# Patient Record
Sex: MALE | Race: WHITE | HISPANIC OR LATINO | Employment: OTHER | URBAN - METROPOLITAN AREA
[De-identification: names, ages, dates, MRNs, and addresses within clinical notes are randomized per-mention and may not be internally consistent; named-entity substitution may affect disease eponyms.]

---

## 2017-11-27 ENCOUNTER — OFFICE VISIT (OUTPATIENT)
Dept: URGENT CARE | Age: 22
End: 2017-11-27
Payer: MEDICARE

## 2017-11-27 PROCEDURE — G0382 LEV 3 HOSP TYPE B ED VISIT: HCPCS | Performed by: FAMILY MEDICINE

## 2017-11-27 PROCEDURE — 99213 OFFICE O/P EST LOW 20 MIN: CPT | Performed by: FAMILY MEDICINE

## 2017-11-28 NOTE — PROGRESS NOTES
Assessment    1  Acute upper respiratory infection (465 9) (J06 9)    Plan  Acute upper respiratory infection    · Amoxicillin 500 MG Oral Capsule; TAKE 1 CAPSULE 3 TIMES DAILY UNTIL GONE    Discussion/Summary  Discussion Summary:   Amoxicillin 3 times a day until finished ( please take probiotics)  medication as needed  as needed  drinks, light/BRAT diet  Gargle and swish mouth with warm salt water or mouthwash  recheck/ follow-up with family physician  go to the hospital emergency department if worse  Chief Complaint    1  Cold Symptoms  Chief Complaint Free Text Note Form: Started with cold symptoms 1 week ago with congestion, runny nose , sore throat, neck pain and a cough  Today started with diarrhea  History of Present Illness  HPI: Congestion, sore throat, cough, neck pain, diarrhea, vomiting   Hospital Based Practices Required Assessment:  Pain Assessment  the patient states they have pain  The pain is located in the throat and sides of neck, body  The patient describes the pain as aching  (on a scale of 0 to 10, the patient rates the pain at 4 )  Abuse And Domestic Violence Screen   Yes, the patient is safe at home  -- The patient states no one is hurting them  Depression And Suicide Screen  No, the patient has not had thoughts of hurting themself  No, the patient has not felt depressed in the past 7 days  Review of Systems  Focused-Male:  Constitutional: as noted in HPI   ENT: sore throat-- and-- nasal discharge, but-- as noted in HPI  Cardiovascular: no complaints of slow or fast heart rate, no chest pain, no palpitations, no leg claudication or lower extremity edema  Respiratory: cough, but-- as noted in HPI  Gastrointestinal: vomiting-- and-- diarrhea, but-- as noted in HPI-- and-- no blood in stools  Genitourinary: no complaints of dysuria or incontinence, no hesitancy, no nocturia, no genital lesion, no inadequacy of penile erection  Musculoskeletal: as noted in HPI  Integumentary: no complaints of skin rash or lesion, no itching or dry skin, no skin wounds  Neurological: no complaints of headache, no confusion, no numbness or tingling, no dizziness or fainting  ROS Reviewed:   ROS reviewed  Active Problems  1  Contusion of right hand, initial encounter (923 20) (L82 765M)   2  Injury of right hand, initial encounter (959 4) (S69 91XA)   3  Viral URI with cough (465 9) (J06 9,B97 89)    Past Medical History  Active Problems And Past Medical History Reviewed: The active problems and past medical history were reviewed and updated today  Family History  Family History Reviewed: The family history was reviewed and updated today  Social History   · No alcohol use   · No drug use   · Occasional alcohol use   · Tobacco use (305 1) (Z72 0)  Social History Reviewed: The social history was reviewed and updated today  The social history was reviewed and is unchanged  Surgical History  Surgical History Reviewed: The surgical history was reviewed and updated today  Current Meds   1  No Reported Medications Recorded  Medication List Reviewed: The medication list was reviewed and updated today  Allergies    1  No Known Drug Allergies    Vitals  Signs   Recorded: 27Nov2017 12:49PM   Temperature: 98 1 F, Temporal  Heart Rate: 71  Respiration: 18  Systolic: 793, LUE, Sitting  Diastolic: 78, LUE, Sitting  Height: 5 ft 8 in  Weight: 240 lb   BMI Calculated: 36 49  BSA Calculated: 2 21  O2 Saturation: 97    Physical Exam   Constitutional  General appearance: No acute distress, well appearing and well nourished  Ears, Nose, Mouth, and Throat  External inspection of ears and nose: Normal    Otoscopic examination: Tympanic membrance translucent with normal light reflex  Canals patent without erythema  -- nasal congestion  -- injection of the oropharynx  Pulmonary  Respiratory effort: No increased work of breathing or signs of respiratory distress  Auscultation of lungs: Clear to auscultation  Cardiovascular  Auscultation of heart: Normal rate and rhythm, normal S1 and S2, without murmurs  Abdomen soft, nontender, no guarding  Lymphatic  Palpation of lymph nodes in neck: No lymphadenopathy  Skin good color and turgor  Neurologic grossly intact, no nuchal rigidity  Psychiatric  Orientation to person, place and time: Normal    Mood and affect: Normal        Message  Return to work or school:    He is able to return to work on  11/29/2017      No work 11/27/2017 and 11/28/2017          Signatures   Electronically signed by : Alejandra Watlers DO; Nov 27 2017  1:10PM EST                       (Author)

## 2017-12-22 ENCOUNTER — HOSPITAL ENCOUNTER (OUTPATIENT)
Dept: RADIOLOGY | Facility: HOSPITAL | Age: 22
Discharge: HOME | End: 2017-12-22

## 2017-12-22 ENCOUNTER — TRANSCRIBE ORDERS (OUTPATIENT)
Dept: URGENT CARE | Age: 22
End: 2017-12-22

## 2017-12-22 ENCOUNTER — APPOINTMENT (OUTPATIENT)
Dept: RADIOLOGY | Age: 22
End: 2017-12-22

## 2017-12-22 ENCOUNTER — OFFICE VISIT (OUTPATIENT)
Dept: URGENT CARE | Age: 22
End: 2017-12-22

## 2017-12-22 DIAGNOSIS — R07.81 PLEURODYNIA: ICD-10-CM

## 2017-12-22 DIAGNOSIS — R05.9 COUGH: ICD-10-CM

## 2017-12-22 PROCEDURE — 99213 OFFICE O/P EST LOW 20 MIN: CPT | Performed by: FAMILY MEDICINE

## 2017-12-22 PROCEDURE — G0382 LEV 3 HOSP TYPE B ED VISIT: HCPCS | Performed by: FAMILY MEDICINE

## 2017-12-22 PROCEDURE — 71101 X-RAY EXAM UNILAT RIBS/CHEST: CPT

## 2018-01-15 NOTE — MISCELLANEOUS
Message  Call to one of patient's listed contacts  Current phone number in patient's chart is incorrect  Patient was finally able to be gotten hold of through contact  Patient told of possible nondisplaced fracture base fifth metacarpal  He will return for splint later today  Signatures   Electronically signed by :  Raheem Morgan, [facility]; Oct  7 2016  3:26PM EST                       (Author)

## 2018-01-18 NOTE — RESULT NOTES
Message  pATIENT RETURNED TO CLINIC THIS EVENING FOR BOXER SPLINT APPLIED TO RIGHT HAND  oR SO REFERRAL GIVEN FOR FURTHER EVALUATION  rADIOLOGIST SUSPECTS SMALL NONDISPLACED FRACTURE AT BASE OF FIFTH METACARPAL  Verified Results  * XR HAND 3+ VIEW RIGHT 06Oct2016 08:03PM Shira Conley Order Number: [id number]     Test Name Result Flag Reference   XR HAND 3+ VW RIGHT (Report)     RIGHT HAND     INDICATION: Fifth digit pain  Prior fracture 8 months ago  COMPARISON: 8/22/2013  VIEWS: 3; 3 images     For the purposes of institution wide universal language the following terms will apply: (thumb=1st digit/finger, index finger=2nd digit/finger, long finger=3rd digit/finger, ring=4th digit/finger and small finger=5th digit/finger)     FINDINGS:     Possible nondisplaced fracture involving the base of the fifth metacarpal seen on the oblique view only  Remodeling of the distal metacarpal related to old fracture  No degenerative changes  No lytic or blastic lesions are seen  Moderate soft tissue swelling adjacent to the fifth metacarpal posteriorly  IMPRESSION:     Possible nondisplaced fracture involving the base of the fifth metacarpal  There is remodeling of the distal shaft of the fifth metacarpal related to an old fracture  Moderate dorsal soft tissue swelling overlying the fifth metacarpal        Workstation performed: [id number]     Signed by:   Victor Manuel Nelson DO   10/6/16       Plan  Contusion of right hand, initial encounter    · Ace Bandage; Status:Complete;   Done: [id number]  Injury of right hand, initial encounter    · *1 -  [facility] (ORTHOPEDIC SURGERY ) Physician  Referral  Consult  Status: Active  Requested for: 89OHI0233  Care Summary provided  : Yes   · * XR HAND 3+ VIEW RIGHT; Status:Complete;   Done: 71LYS6888 08:03PM    Signatures   Electronically signed by :  Panda Rivera [facility]; Oct  7 2016  8:33PM EST                       (Author)

## 2018-01-18 NOTE — MISCELLANEOUS
Message  Return to work or school:    He is able to return to work on  11/29/2017      No work 11/27/2017 and 11/28/2017          Signatures   Electronically signed by : Torrie Boyle DO; Nov 27 2017  1:10PM EST                       (Author)

## 2018-01-18 NOTE — MISCELLANEOUS
Message  Return to work or school:   Scar Copeland is under my professional care  He was seen in my office on 10/6/2016             Signatures  Electronically signed by :  Thao Oseguera, [facility]; Oct  6 2016  8:24PM EST                       (Author)

## 2018-01-18 NOTE — MISCELLANEOUS
Message  10-7-16: 1  Telephone call to phone number on [facility] is chart requesting callback regarding Radiology results of x-ray taken last evening urgent care  11:48 AM        1 Amended By: Erich Do; Oct 07 2016 2:47 PM EST    Plan  Contusion of right hand, initial encounter    · Ace Bandage; Status:Complete;   Done: [id number]  Injury of right hand, initial encounter    · * XR HAND 3+ VIEW RIGHT; Status:Resulted - Requires Verification;   Done: 53FSI5013  08:03PM    Signatures   Electronically signed by :  Leandro Meyer, [facility]; Oct  7 2016  3:48PM EST                       (Author)

## 2018-01-24 VITALS
SYSTOLIC BLOOD PRESSURE: 134 MMHG | HEIGHT: 69 IN | DIASTOLIC BLOOD PRESSURE: 80 MMHG | WEIGHT: 240 LBS | OXYGEN SATURATION: 96 % | HEART RATE: 81 BPM | BODY MASS INDEX: 35.55 KG/M2 | RESPIRATION RATE: 22 BRPM | TEMPERATURE: 98.7 F

## 2018-01-24 NOTE — MISCELLANEOUS
Message  Spoke to patient regarding his xray results  He has not been able to fill his prescriptions yet  He reports he continues to have symptoms  Discussed that he does not have a pneumonia at this time, patient understands        Plan  Acute upper respiratory infection    · Clarithromycin 250 MG Oral Tablet; TAKE 1 TABLET EVERY 12 HOURS DAILY  Costochondritis    · Methocarbamol 500 MG Oral Tablet (Robaxin); TAKE 2 TABLETS 4 TIMES DAILY  AS NEEDED   · Naproxen 500 MG Oral Tablet; TAKE 1 TABLET EVERY 12 HOURS DAILY    Signatures   Electronically signed by : WINIFRED Rosales; Dec 26 2017  8:18AM EST                       (Author)

## 2018-01-24 NOTE — PROGRESS NOTES
Assessment    1  Cough (786 2) (R05)   2  Rib pain on left side (786 50) (R07 81)   3  Acute upper respiratory infection (465 9) (J06 9)   4  Costochondritis (733 6) (M94 0)    Plan  Acute upper respiratory infection    · Clarithromycin 250 MG Oral Tablet; TAKE 1 TABLET EVERY 12 HOURS DAILY  Costochondritis    · Methocarbamol 500 MG Oral Tablet (Robaxin); TAKE 2 TABLETS 4 TIMES DAILY AS  NEEDED   · Naproxen 500 MG Oral Tablet; TAKE 1 TABLET EVERY 12 HOURS DAILY  Cough, Rib pain on left side    · * XR RIBS LEFT W PA CHEST MIN 3 VIEWS; Status:Active; Requested for:[id number];     Discussion/Summary  Discussion Summary:   Upper respiratory infection: Begin Biaxin twice daily for 10 days  Eat yogurt to avoid stomach upset  Continue with Tylenol Cod and Cough for your symptoms  Costochondritis: Use Naproxen twice daily for inflammation and pain  Do not take anything OTC containing ibuprofen while using Naproxen  Use Robaxin for muscle relaxation, may cause drowsiness, do not operate motor vehicles while using this medication  Stay hydrated  Return to your PCP for continued symptoms  Go to the ER for any respiratory distress  Understands and agrees with treatment plan: The treatment plan was reviewed with the patient/guardian  The patient/guardian understands and agrees with the treatment plan      Chief Complaint  Chief Complaint Free Text Note Form: was here a few weeks ago,tx for uri  now he continues to cough, has chills at night  has yellow green mucous production  taking cold and flu liquid  now pain in left rib cage with any deep breath   petra      History of Present Illness  HPI: This is a 18-year-old male presenting URI symptoms with cough x 4-5 weeks  He was seen 3 weeks ago and treated with amoxicillin, he states his symptoms improved slightly but did not go away  He is a smoker  He is complaining productive cough, nasal congestion, sore throat  He admits to posttussive emesis   He is also complaining of fevers and states that he has woken up multiple times drenched in sweat  He denies any abdominal pain, diarrhea, nausea  He woke up this morning with a sharp, constant pain in his lower left rib area  Pain is worse with movement such as coughing, and got a little bit better when he took Advil  At home he has been using Tylenol Cough and cold which helped his symptoms a little  Hospital Based Practices Required Assessment:   Abuse And Domestic Violence Screen    Yes, the patient is safe at home  The patient states no one is hurting them  Depression And Suicide Screen  No, the patient has not had thoughts of hurting themself  No, the patient has not felt depressed in the past 7 days  Review of Systems  Focused-Male:   Constitutional: fever, feeling poorly, chills and feeling tired  ENT: sore throat and nasal discharge, but no earache, no nosebleeds and no hearing loss  Cardiovascular: rib pain, but as noted in HPI, the heart rate was not slow, no chest pain, the heart rate was not fast and no palpitations  Respiratory: cough, but no shortness of breath, no orthopnea, no wheezing and no shortness of breath during exertion  Gastrointestinal: no complaints of abdominal pain, no constipation, no nausea or vomiting, no diarrhea or bloody stools  Musculoskeletal: myalgias, but as noted in HPI, no joint swelling and no joint stiffness  Integumentary: no rashes  Neurological: no confusion  Active Problems    1  Acute upper respiratory infection (465 9) (J06 9)   2  Contusion of right hand, initial encounter (923 20) (S60 221A)   3  Injury of right hand, initial encounter (959 4) (S69 91XA)   4  Rib pain on left side (786 50) (R07 81)   5  Viral URI with cough (465 9) (J06 9,B97 89)    Social History    · No alcohol use   · No drug use   · Occasional alcohol use   · Tobacco use (305 1) (Z72 0)    Allergies    1   No Known Drug Allergies    Vitals  Signs   Recorded: 66Ytf5516 08:17PM Temperature: 98 7 F, Oral  Heart Rate: 81  Respiration: 22  Systolic: 702  Diastolic: 80  Height: 5 ft 9 in  Weight: 240 lb   BMI Calculated: 35 44  BSA Calculated: 2 23  O2 Saturation: 96    Physical Exam    Constitutional   General appearance: No acute distress, well appearing and well nourished  Eyes   Conjunctiva and lids: No swelling, erythema, or discharge  Pupils and irises: Equal, round and reactive to light  Ears, Nose, Mouth, and Throat   External inspection of ears and nose: Normal     Otoscopic examination: Tympanic membrance translucent with normal light reflex  Canals patent without erythema  Nasal mucosa, septum, and turbinates: Abnormal   erythematous and mildly edematous nasal turbinates bilaterally  Clear nasal discharge  Oropharynx: Abnormal   Mildly erythematous posterior pharynx with slight edema of the tonsils but no exudates  Pulmonary   Respiratory effort: Abnormal   patient is splinting his breathing due to rib pain on deep inspiration  Auscultation of lungs: Clear to auscultation  Not a great effort for deep breathing by the patient  Cardiovascular   Auscultation of heart: Normal rate and rhythm, normal S1 and S2, without murmurs  Abdomen   Abdomen: Non-tender, no masses  Lymphatic   Palpation of lymph nodes in neck: Abnormal   cervical lymphadenopathy felt  Musculoskeletal   Gait and station: Normal     Inspection/palpation of joints, bones, and muscles: Abnormal   Tenderness to palpation on lower left anterior ribs  Psychiatric   Orientation to person, place and time: Normal     Mood and affect: Normal        Results/Data  Diagnostic Studies Reviewed: I personally reviewed the films/images/results in the office today  My interpretation follows  X-ray Review No acute osseus abnormalities noted        Signatures   Electronically signed by : WINIFRED Hollins; Dec 22 2017 11:41PM EST                       (Author)    Electronically signed by : Kailey Edwards BECCA Martinez ; Dec 31 2017  2:42PM EST                       (Co-author)

## 2018-03-01 ENCOUNTER — OFFICE VISIT (OUTPATIENT)
Dept: URGENT CARE | Age: 23
End: 2018-03-01
Payer: COMMERCIAL

## 2018-03-01 ENCOUNTER — APPOINTMENT (OUTPATIENT)
Dept: RADIOLOGY | Age: 23
End: 2018-03-01
Attending: PHYSICIAN ASSISTANT
Payer: COMMERCIAL

## 2018-03-01 VITALS
TEMPERATURE: 98.2 F | OXYGEN SATURATION: 97 % | HEART RATE: 87 BPM | BODY MASS INDEX: 38 KG/M2 | DIASTOLIC BLOOD PRESSURE: 90 MMHG | RESPIRATION RATE: 18 BRPM | SYSTOLIC BLOOD PRESSURE: 130 MMHG | HEIGHT: 69 IN | WEIGHT: 256.6 LBS

## 2018-03-01 DIAGNOSIS — S69.91XA HAND INJURY, RIGHT, INITIAL ENCOUNTER: ICD-10-CM

## 2018-03-01 DIAGNOSIS — S62.652A NONDISPLACED FRACTURE OF MIDDLE PHALANX OF RIGHT MIDDLE FINGER, INITIAL ENCOUNTER FOR CLOSED FRACTURE: Primary | ICD-10-CM

## 2018-03-01 PROCEDURE — S9088 SERVICES PROVIDED IN URGENT: HCPCS | Performed by: FAMILY MEDICINE

## 2018-03-01 PROCEDURE — 73130 X-RAY EXAM OF HAND: CPT

## 2018-03-01 PROCEDURE — 99213 OFFICE O/P EST LOW 20 MIN: CPT | Performed by: FAMILY MEDICINE

## 2018-03-01 NOTE — LETTER
March 1, 2018     Patient: Red Room   YOB: 1995   Date of Visit: 3/1/2018       To Whom It May Concern: It is my medical opinion that Mary Mohan may return to light duty immediately with the following restrictions: no lifting >5 lbs with right hand for 4-6 weeks       If you have any questions or concerns, please don't hesitate to call           Sincerely,        Mary Carreno PA-C    CC: No Recipients

## 2018-03-02 NOTE — PROGRESS NOTES
oJn Now        NAME: Joseline Allen is a 20 y o  male  : 1995    MRN: [id number]  DATE: 2018  TIME: 10:22 PM    Assessment and Plan   Nondisplaced fracture of middle phalanx of right middle finger, initial encounter for closed fracture [B01 189M]  1  Nondisplaced fracture of middle phalanx of right middle finger, initial encounter for closed fracture     2  Hand injury, right, initial encounter  XR hand 3+ vw right         Patient Instructions   Ice, elevate, use splint on finger for comfort  Avoid heavy lifting for 4-6 weeks  Follow up with orthopedics if you have continued pain in 1 week  Tylenol or motrin for discomfort  Chief Complaint     Chief Complaint   Patient presents with    Hand Injury     Jammed R 3rd finger/hand last night while closing a window  Swelling and numbness on top of hand into finger with some bruising noted on finger  History of Present Illness       21 y/o right hand dominant male hit his right middle finger against a window when he was trying to shut it yesterday  He has very minimal pain, but is bruised and swollen  Review of Systems   Review of Systems   Constitutional: Negative  Musculoskeletal:        Right middle finger pain   All other systems reviewed and are negative  Current Medications     No current outpatient prescriptions on file  Current Allergies     Allergies as of 2018    (No Known Allergies)            The following portions of the patient's history were reviewed and updated as appropriate: allergies, current medications, past family history, past medical history, past social history, past surgical history and problem list    History reviewed  No pertinent past medical history  History reviewed  No pertinent surgical history          Objective   /90 (BP Location: Left arm, Patient Position: Sitting, Cuff Size: Large)   Pulse 87   Temp 98 2 °F (36 8 °C) (Oral)   Resp 18   Ht 5' 9" (1 753 m) Wt 116 kg (256 lb 9 6 oz)   SpO2 97%   BMI 37 89 kg/m²          Physical Exam     Physical Exam   Constitutional: He is oriented to person, place, and time  He appears well-developed and well-nourished  Musculoskeletal:        Arms:  Neurological: He is alert and oriented to person, place, and time  Psychiatric: He has a normal mood and affect  His behavior is normal    Nursing note and vitals reviewed  Splint application  Date/Time: 3/1/2018 10:20 PM  Performed by: Mekhi Pereira  Authorized by: Mekhi Pereira     Consent:     Consent obtained:  Verbal    Consent given by:  Patient  Procedure details:     Laterality:  Right    Location:  Finger    Finger:  R long finger    Strapping: no  Cast type:  Finger    Splint type:  Finger splint, dynamic  Post-procedure details:     Pain:  Unchanged    Sensation:  Normal    Patient tolerance of procedure: Tolerated well, no immediate complications          Xray: preliminary report shows fx of the proximal aspect of the middle phalanx of the middle finger

## 2018-03-02 NOTE — PATIENT INSTRUCTIONS
Ice, elevate, use splint on finger for comfort  Avoid heavy lifting for 4-6 weeks  Follow up with orthopedics if you have continued pain in 1 week  Tylenol or motrin for discomfort  Finger Fracture   WHAT YOU NEED TO KNOW:   A finger fracture is a break in 1 or more of the bones in your finger  DISCHARGE INSTRUCTIONS:   Return to the emergency department if:   · Your cast or splint gets wet, damaged, or comes off  · Your splint or cast feels too tight  · You have severe pain  · Your injured finger is numb, cold, or pale  Contact your healthcare provider or hand specialist if:   · Your pain or swelling gets worse, even after treatment  · You have questions or concerns about your condition or care  Medicines:   · NSAIDs , such as ibuprofen, help decrease swelling, pain, and fever  This medicine is available with or without a doctor's order  NSAIDs can cause stomach bleeding or kidney problems in certain people  If you take blood thinner medicine, always ask your healthcare provider if NSAIDs are safe for you  Always read the medicine label and follow directions  Acetaminophen  decreases pain and fever  It is available without a doctor's order  Ask how much to take and how often to take it  Follow directions  Acetaminophen can cause liver damage if not taken correctly  · Take your medicine as directed  Contact your healthcare provider if you think your medicine is not helping or if you have side effects  Tell him or her if you are allergic to any medicine  Keep a list of the medicines, vitamins, and herbs you take  Include the amounts, and when and why you take them  Bring the list or the pill bottles to follow-up visits  Carry your medicine list with you in case of an emergency  Self-care:   · Wear your splint as directed    Do not remove your splint until you follow up with your healthcare provider or hand specialist      · Apply ice  on your finger for 15 to 20 minutes every hour or as directed  Use an ice pack, or put crushed ice in a plastic bag  Cover it with a towel before you apply it to your skin  Ice helps prevent tissue damage and decreases swelling and pain  · Elevate  your finger above the level of your heart as often as you can  This will help decrease swelling and pain  Prop your hand on pillows or blankets to keep it elevated comfortably  · Go to physical therapy as directed  A physical therapist teaches you exercises to help improve movement and strength, and to decrease pain  Follow up with your healthcare provider or hand specialist within 2 days:  Write down your questions so you remember to ask them during your visits  © 2017 [facility] Information is for End User's use only and may not be sold, redistributed or otherwise used for commercial purposes  All illustrations and images included in CareNotes® are the copyrighted property of [employer]  or [employer]  The above information is an  only  It is not intended as medical advice for individual conditions or treatments  Talk to your doctor, nurse or pharmacist before following any medical regimen to see if it is safe and effective for you

## 2020-01-20 ENCOUNTER — OFFICE VISIT (OUTPATIENT)
Dept: URGENT CARE | Age: 25
End: 2020-01-20

## 2020-01-20 VITALS
HEART RATE: 99 BPM | WEIGHT: 262 LBS | RESPIRATION RATE: 18 BRPM | OXYGEN SATURATION: 98 % | TEMPERATURE: 99 F | BODY MASS INDEX: 39.71 KG/M2 | SYSTOLIC BLOOD PRESSURE: 161 MMHG | DIASTOLIC BLOOD PRESSURE: 103 MMHG | HEIGHT: 68 IN

## 2020-01-20 DIAGNOSIS — J02.9 PHARYNGITIS, UNSPECIFIED ETIOLOGY: Primary | ICD-10-CM

## 2020-01-20 LAB — S PYO AG THROAT QL: NEGATIVE

## 2020-01-20 PROCEDURE — 87880 STREP A ASSAY W/OPTIC: CPT | Performed by: PHYSICIAN ASSISTANT

## 2020-01-20 PROCEDURE — G0382 LEV 3 HOSP TYPE B ED VISIT: HCPCS | Performed by: PHYSICIAN ASSISTANT

## 2020-01-20 RX ORDER — AMOXICILLIN 500 MG/1
500 CAPSULE ORAL EVERY 8 HOURS SCHEDULED
Qty: 30 CAPSULE | Refills: 0 | Status: SHIPPED | OUTPATIENT
Start: 2020-01-20 | End: 2020-01-30

## 2020-01-20 RX ORDER — PREDNISONE 10 MG/1
TABLET ORAL
Qty: 21 TABLET | Refills: 0 | Status: SHIPPED | OUTPATIENT
Start: 2020-01-20

## 2020-01-21 NOTE — PROGRESS NOTES
[address] [facility]        NAME: Penny Champagne is a 30 y o  male  : 1995    MRN: [id number]  DATE: 2020  TIME: 9:52 PM    Assessment and Plan   Pharyngitis, unspecified etiology [J02 9]  1  Pharyngitis, unspecified etiology  POCT rapid strepA    amoxicillin (AMOXIL) 500 mg capsule    predniSONE 10 mg tablet         Patient Instructions       Follow up with PCP in 3-5 days  Proceed to  ER if symptoms worsen  Chief Complaint     Chief Complaint   Patient presents with    Sore Throat     since wednesday, today worse cant swallow tonsils pain    Chest Pain     chest is starting to hurt  History of Present Illness       Patient for evaluation of sore throat which started Wednesday  Patient has been having increasing pain and patient is able to swallow but he has a lot of pain with swallowing  Review of Systems   Review of Systems   Constitutional: Negative  Negative for fever  HENT: Positive for sore throat and trouble swallowing (Pain with swallowing)  Negative for congestion, ear discharge, ear pain, postnasal drip, rhinorrhea, sinus pressure, sinus pain and voice change  Eyes: Negative  Respiratory: Negative  Cardiovascular: Negative  Current Medications       Current Outpatient Medications:     amoxicillin (AMOXIL) 500 mg capsule, Take 1 capsule (500 mg total) by mouth every 8 (eight) hours for 10 days, Disp: 30 capsule, Rfl: 0    predniSONE 10 mg tablet, Six tablets day 1; 5 tablets day to; 4 tablets day 3; 3 tablets day 4; 2 tablets day 5; and 1 tablet day 6, Disp: 21 tablet, Rfl: 0    Current Allergies     Allergies as of 2020    (No Known Allergies)            The following portions of the patient's history were reviewed and updated as appropriate: allergies, current medications, past family history, past medical history, past social history, past surgical history and problem list      History reviewed   No pertinent past medical history  History reviewed  No pertinent surgical history  Family History   Problem Relation Age of Onset    Hypertension Mother     Hypertension Father          Medications have been verified  Objective   BP (!) 161/103   Pulse 99   Temp 99 °F (37 2 °C) (Temporal)   Resp 18   Ht 5' 8" (1 727 m)   Wt 119 kg (262 lb)   SpO2 98%   BMI 39 84 kg/m²        Physical Exam     Physical Exam   Constitutional: He is oriented to person, place, and time  He appears well-developed and well-nourished  No distress  HENT:   Head: Normocephalic and atraumatic  Right Ear: External ear normal    Left Ear: External ear normal    Nose: Nose normal    Mouth/Throat: Oropharyngeal exudate present  Bilateral tonsillar erythema with +2 soft tissue swelling  Eyes: Pupils are equal, round, and reactive to light  Conjunctivae and EOM are normal  Right eye exhibits no discharge  Left eye exhibits no discharge  Cardiovascular: Normal rate, regular rhythm and normal heart sounds  No murmur heard  Pulmonary/Chest: Effort normal and breath sounds normal  No stridor  No respiratory distress  He has no wheezes  He has no rales  Lymphadenopathy:     He has cervical adenopathy  Neurological: He is alert and oriented to person, place, and time  Skin: Skin is warm and dry  He is not diaphoretic  Psychiatric: He has a normal mood and affect  His behavior is normal  Judgment and thought content normal    Nursing note and vitals reviewed

## 2020-12-23 ENCOUNTER — NURSE TRIAGE (OUTPATIENT)
Dept: OTHER | Facility: OTHER | Age: 25
End: 2020-12-23

## 2020-12-23 DIAGNOSIS — Z20.828 EXPOSURE TO SARS-ASSOCIATED CORONAVIRUS: Primary | ICD-10-CM

## 2020-12-23 DIAGNOSIS — Z20.828 EXPOSURE TO SARS-ASSOCIATED CORONAVIRUS: ICD-10-CM

## 2020-12-23 PROCEDURE — U0003 INFECTIOUS AGENT DETECTION BY NUCLEIC ACID (DNA OR RNA); SEVERE ACUTE RESPIRATORY SYNDROME CORONAVIRUS 2 (SARS-COV-2) (CORONAVIRUS DISEASE [COVID-19]), AMPLIFIED PROBE TECHNIQUE, MAKING USE OF HIGH THROUGHPUT TECHNOLOGIES AS DESCRIBED BY CMS-2020-01-R: HCPCS | Performed by: FAMILY MEDICINE

## 2020-12-24 LAB — SARS-COV-2 RNA SPEC QL NAA+PROBE: NOT DETECTED

## 2021-04-26 ENCOUNTER — IMMUNIZATIONS (OUTPATIENT)
Dept: FAMILY MEDICINE CLINIC | Facility: HOSPITAL | Age: 26
End: 2021-04-26

## 2021-04-26 DIAGNOSIS — Z23 ENCOUNTER FOR IMMUNIZATION: Primary | ICD-10-CM

## 2021-04-26 PROCEDURE — 91301 SARS-COV-2 / COVID-19 MRNA VACCINE (MODERNA) 100 MCG: CPT

## 2021-04-26 PROCEDURE — 0011A SARS-COV-2 / COVID-19 MRNA VACCINE (MODERNA) 100 MCG: CPT

## 2021-05-24 ENCOUNTER — IMMUNIZATIONS (OUTPATIENT)
Dept: FAMILY MEDICINE CLINIC | Facility: HOSPITAL | Age: 26
End: 2021-05-24

## 2021-05-24 DIAGNOSIS — Z23 ENCOUNTER FOR IMMUNIZATION: Primary | ICD-10-CM

## 2021-05-24 PROCEDURE — 91301 SARS-COV-2 / COVID-19 MRNA VACCINE (MODERNA) 100 MCG: CPT

## 2021-05-24 PROCEDURE — 0012A SARS-COV-2 / COVID-19 MRNA VACCINE (MODERNA) 100 MCG: CPT

## 2023-09-22 PROCEDURE — 99283 EMERGENCY DEPT VISIT LOW MDM: CPT

## 2023-09-23 ENCOUNTER — HOSPITAL ENCOUNTER (EMERGENCY)
Facility: HOSPITAL | Age: 28
Discharge: HOME | End: 2023-09-23
Attending: EMERGENCY MEDICINE

## 2023-09-23 VITALS
WEIGHT: 276.68 LBS | BODY MASS INDEX: 42.07 KG/M2 | SYSTOLIC BLOOD PRESSURE: 149 MMHG | RESPIRATION RATE: 16 BRPM | HEART RATE: 85 BPM | OXYGEN SATURATION: 96 % | TEMPERATURE: 99.1 F | DIASTOLIC BLOOD PRESSURE: 81 MMHG

## 2023-09-23 DIAGNOSIS — G43.909 MIGRAINE: Primary | ICD-10-CM

## 2023-09-23 LAB
ALBUMIN SERPL BCP-MCNC: 4.5 G/DL (ref 3.5–5)
ALP SERPL-CCNC: 63 U/L (ref 34–104)
ALT SERPL W P-5'-P-CCNC: 28 U/L (ref 7–52)
ANION GAP SERPL CALCULATED.3IONS-SCNC: 9 MMOL/L
AST SERPL W P-5'-P-CCNC: 17 U/L (ref 13–39)
BASOPHILS # BLD AUTO: 0.03 THOUSANDS/ÂΜL (ref 0–0.1)
BASOPHILS NFR BLD AUTO: 0 % (ref 0–1)
BILIRUB SERPL-MCNC: 0.55 MG/DL (ref 0.2–1)
BUN SERPL-MCNC: 13 MG/DL (ref 5–25)
CALCIUM SERPL-MCNC: 9.7 MG/DL (ref 8.4–10.2)
CHLORIDE SERPL-SCNC: 99 MMOL/L (ref 96–108)
CO2 SERPL-SCNC: 26 MMOL/L (ref 21–32)
CREAT SERPL-MCNC: 0.97 MG/DL (ref 0.6–1.3)
EOSINOPHIL # BLD AUTO: 0.01 THOUSAND/ÂΜL (ref 0–0.61)
EOSINOPHIL NFR BLD AUTO: 0 % (ref 0–6)
ERYTHROCYTE [DISTWIDTH] IN BLOOD BY AUTOMATED COUNT: 12.4 % (ref 11.6–15.1)
GFR SERPL CREATININE-BSD FRML MDRD: 104 ML/MIN/1.73SQ M
GLUCOSE SERPL-MCNC: 136 MG/DL (ref 65–140)
HCT VFR BLD AUTO: 42.8 % (ref 36.5–49.3)
HGB BLD-MCNC: 14.5 G/DL (ref 12–17)
IMM GRANULOCYTES # BLD AUTO: 0.08 THOUSAND/UL (ref 0–0.2)
IMM GRANULOCYTES NFR BLD AUTO: 1 % (ref 0–2)
LYMPHOCYTES # BLD AUTO: 1.77 THOUSANDS/ÂΜL (ref 0.6–4.47)
LYMPHOCYTES NFR BLD AUTO: 12 % (ref 14–44)
MCH RBC QN AUTO: 28.4 PG (ref 26.8–34.3)
MCHC RBC AUTO-ENTMCNC: 33.9 G/DL (ref 31.4–37.4)
MCV RBC AUTO: 84 FL (ref 82–98)
MONOCYTES # BLD AUTO: 2 THOUSAND/ÂΜL (ref 0.17–1.22)
MONOCYTES NFR BLD AUTO: 14 % (ref 4–12)
NEUTROPHILS # BLD AUTO: 10.67 THOUSANDS/ÂΜL (ref 1.85–7.62)
NEUTS SEG NFR BLD AUTO: 73 % (ref 43–75)
NRBC BLD AUTO-RTO: 0 /100 WBCS
PLATELET # BLD AUTO: 288 THOUSANDS/UL (ref 149–390)
PMV BLD AUTO: 12.2 FL (ref 8.9–12.7)
POTASSIUM SERPL-SCNC: 3.8 MMOL/L (ref 3.5–5.3)
PROT SERPL-MCNC: 7.6 G/DL (ref 6.4–8.4)
RBC # BLD AUTO: 5.11 MILLION/UL (ref 3.88–5.62)
SODIUM SERPL-SCNC: 134 MMOL/L (ref 135–147)
WBC # BLD AUTO: 14.56 THOUSAND/UL (ref 4.31–10.16)

## 2023-09-23 PROCEDURE — 99285 EMERGENCY DEPT VISIT HI MDM: CPT | Performed by: EMERGENCY MEDICINE

## 2023-09-23 PROCEDURE — 96375 TX/PRO/DX INJ NEW DRUG ADDON: CPT

## 2023-09-23 PROCEDURE — 80053 COMPREHEN METABOLIC PANEL: CPT

## 2023-09-23 PROCEDURE — 96365 THER/PROPH/DIAG IV INF INIT: CPT

## 2023-09-23 PROCEDURE — 96361 HYDRATE IV INFUSION ADD-ON: CPT

## 2023-09-23 PROCEDURE — 36415 COLL VENOUS BLD VENIPUNCTURE: CPT

## 2023-09-23 PROCEDURE — 85025 COMPLETE CBC W/AUTO DIFF WBC: CPT

## 2023-09-23 RX ORDER — KETOROLAC TROMETHAMINE 30 MG/ML
30 INJECTION, SOLUTION INTRAMUSCULAR; INTRAVENOUS ONCE
Status: COMPLETED | OUTPATIENT
Start: 2023-09-23 | End: 2023-09-23

## 2023-09-23 RX ORDER — METOCLOPRAMIDE HYDROCHLORIDE 5 MG/ML
10 INJECTION INTRAMUSCULAR; INTRAVENOUS ONCE
Status: COMPLETED | OUTPATIENT
Start: 2023-09-23 | End: 2023-09-23

## 2023-09-23 RX ORDER — DIPHENHYDRAMINE HYDROCHLORIDE 50 MG/ML
25 INJECTION INTRAMUSCULAR; INTRAVENOUS ONCE
Status: COMPLETED | OUTPATIENT
Start: 2023-09-23 | End: 2023-09-23

## 2023-09-23 RX ORDER — MAGNESIUM SULFATE HEPTAHYDRATE 40 MG/ML
2 INJECTION, SOLUTION INTRAVENOUS ONCE
Status: COMPLETED | OUTPATIENT
Start: 2023-09-23 | End: 2023-09-23

## 2023-09-23 RX ORDER — METOCLOPRAMIDE 10 MG/1
10 TABLET ORAL EVERY 6 HOURS
Qty: 40 TABLET | Refills: 0 | Status: SHIPPED | OUTPATIENT
Start: 2023-09-23 | End: 2023-10-03

## 2023-09-23 RX ADMIN — KETOROLAC TROMETHAMINE 30 MG: 30 INJECTION INTRAMUSCULAR; INTRAVENOUS at 01:14

## 2023-09-23 RX ADMIN — MAGNESIUM SULFATE HEPTAHYDRATE 2 G: 40 INJECTION, SOLUTION INTRAVENOUS at 01:26

## 2023-09-23 RX ADMIN — DIPHENHYDRAMINE HYDROCHLORIDE 25 MG: 50 INJECTION, SOLUTION INTRAMUSCULAR; INTRAVENOUS at 01:12

## 2023-09-23 RX ADMIN — METOCLOPRAMIDE 10 MG: 5 INJECTION, SOLUTION INTRAMUSCULAR; INTRAVENOUS at 01:13

## 2023-09-23 RX ADMIN — SODIUM CHLORIDE 1000 ML: 0.9 INJECTION, SOLUTION INTRAVENOUS at 01:29

## 2023-09-23 NOTE — DISCHARGE INSTRUCTIONS
Today we assessed you for your headaches. We sent some basic lab work that came back normal compared to your lab work done at [facility]. We were able to review your workup at [facility] and we treated you for a migraine. We will be giving you a prescription for Reglan to take at home when needed. You should follow up with neurology to discuss migraine treatments long term as well as cardiology to discuss managing your hypertension better.   If your headaches worsen or you begin having difficulty breathing, shortness of breath, slurring your words, difficulty walking, or dizziness then return to the ED

## 2023-09-23 NOTE — ED PROVIDER NOTES
History  Chief Complaint   Patient presents with   • Headache     Patient reports that he has pressure in his head, seen at [facility] for headache on the 19th and states that they broke his headache but has continued with the pressure in his head. 25year old male presenting to the ED for 1 month of worsening headaches. 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 He has global headache with loss of vision and "wooshing" sounds in his ears. The patient also notes tension in his neck and back. Patient states the pain fluctuates and sometimes will go away with ibuprofen. The patient was seen at [facility] 4 days ago and given an extensive workup including carboxyhemoglobin, CT scan, CBC, CMP, EKG with no relevant findings. Patient was given a migraine cocktail at [facility] which helped the patient. Patient states his headache then returned and the pressure in his head worsened. He returned to [facility] yesterday but left before evaluation. Patient came to [facility] ED today because his headache would not go away with ibuprofen. Patient states the pressure in his head is worse and the whooshing sound is worse. Patient mentions he vomitied yesterday due to the pain and it was non bloody non bilious. 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 Patient smoke 1.5 packs a cigarettes a day and denies alcohol, marijuana, and drug use. Patient did not smoke today. Patient denies chest pain, abdominal pain, dizziness, numbness, tingling, shortness of breath, difficulty breathing. Prior to Admission Medications   Prescriptions Last Dose Informant Patient Reported?  Taking?   predniSONE 10 mg tablet   No No   Sig: Six tablets day 1; 5 tablets day to; 4 tablets day 3; 3 tablets day 4; 2 tablets day 5; and 1 tablet day 6 Facility-Administered Medications: None       Past Medical History:   Diagnosis Date   • Hypertension        History reviewed. No pertinent surgical history. Family History   Problem Relation Age of Onset   • Hypertension Mother    • Hypertension Father      I have reviewed and agree with the history as documented. E-Cigarette/Vaping   • E-Cigarette Use Never User      E-Cigarette/Vaping Substances     Social History     Tobacco Use   • Smoking status: Every Day     Packs/day: 0.50     Types: Cigarettes   • Smokeless tobacco: Never   Vaping Use   • Vaping Use: Never used   Substance Use Topics   • Alcohol use: No     Comment: occasional alcohol use per allscripts   • Drug use: No        Review of Systems   Constitutional: Negative. HENT: Negative. Eyes: Positive for visual disturbance. Respiratory: Negative. Cardiovascular: Negative. Gastrointestinal: Positive for nausea and vomiting. Endocrine: Negative. Genitourinary: Negative. Musculoskeletal: Positive for myalgias. Skin: Negative. Allergic/Immunologic: Negative. Neurological: Positive for weakness and headaches. Hematological: Negative. Psychiatric/Behavioral: Negative. Physical Exam  ED Triage Vitals   Temperature Pulse Respirations Blood Pressure SpO2   09/23/23 0009 09/23/23 0009 09/23/23 0009 09/23/23 0009 09/23/23 0009   99.1 °F (37.3 °C) 87 16 131/78 99 %      Temp Source Heart Rate Source Patient Position - Orthostatic VS BP Location FiO2 (%)   09/23/23 0009 09/23/23 0009 09/23/23 0009 09/23/23 0009 --   Oral Monitor Sitting Left arm       Pain Score       09/23/23 0114       9             Orthostatic Vital Signs  Vitals:    09/23/23 0009 09/23/23 0131   BP: 131/78 149/81   Pulse: 87 85   Patient Position - Orthostatic VS: Sitting Sitting       Physical Exam  Constitutional:       Appearance: Normal appearance. He is normal weight. HENT:      Head: Normocephalic and atraumatic.       Right Ear: External ear normal.      Left Ear: External ear normal.      Nose: Nose normal.      Mouth/Throat:      Pharynx: Oropharynx is clear. Eyes:      Extraocular Movements: Extraocular movements intact. Cardiovascular:      Rate and Rhythm: Normal rate. Pulses: Normal pulses. Heart sounds: Normal heart sounds. Pulmonary:      Effort: Pulmonary effort is normal.      Breath sounds: Normal breath sounds. Abdominal:      General: Bowel sounds are normal.      Palpations: Abdomen is soft. Musculoskeletal:         General: Normal range of motion. Cervical back: Normal range of motion. Skin:     General: Skin is warm. Capillary Refill: Capillary refill takes less than 2 seconds. Neurological:      General: No focal deficit present. Mental Status: He is alert and oriented to person, place, and time.    Psychiatric:         Mood and Affect: Mood normal.         Behavior: Behavior normal.         ED Medications  Medications   ketorolac (TORADOL) injection 30 mg (30 mg Intravenous Given 9/23/23 0114)   metoclopramide (REGLAN) injection 10 mg (10 mg Intravenous Given 9/23/23 0113)   diphenhydrAMINE (BENADRYL) injection 25 mg (25 mg Intravenous Given 9/23/23 0112)   magnesium sulfate 2 g/50 mL IVPB (premix) 2 g (0 g Intravenous Stopped 9/23/23 0226)   sodium chloride 0.9 % bolus 1,000 mL (0 mL Intravenous Stopped 9/23/23 0302)       Diagnostic Studies  Results Reviewed     Procedure Component Value Units Date/Time    Comprehensive metabolic panel [[id number]]  (Abnormal) Collected: 09/23/23 0130    Lab Status: Final result Specimen: Blood from Arm, Right Updated: 09/23/23 0211     Sodium 134 mmol/L      Potassium 3.8 mmol/L      Chloride 99 mmol/L      CO2 26 mmol/L      ANION GAP 9 mmol/L      BUN 13 mg/dL      Creatinine 0.97 mg/dL      Glucose 136 mg/dL      Calcium 9.7 mg/dL      AST 17 U/L      ALT 28 U/L      Alkaline Phosphatase 63 U/L      Total Protein 7.6 g/dL      Albumin 4.5 g/dL      Total Bilirubin 0.55 mg/dL      eGFR 104 ml/min/1.73sq m     Narrative:      National Kidney Disease Foundation guidelines for Chronic Kidney Disease (CKD):   •  Stage 1 with normal or high GFR (GFR > 90 mL/min/1.73 square meters)  •  Stage 2 Mild CKD (GFR = 60-89 mL/min/1.73 square meters)  •  Stage 3A Moderate CKD (GFR = 45-59 mL/min/1.73 square meters)  •  Stage 3B Moderate CKD (GFR = 30-44 mL/min/1.73 square meters)  •  Stage 4 Severe CKD (GFR = 15-29 mL/min/1.73 square meters)  •  Stage 5 End Stage CKD (GFR <15 mL/min/1.73 square meters)  Note: GFR calculation is accurate only with a steady state creatinine    CBC and differential [[id number]]  (Abnormal) Collected: 09/23/23 0130    Lab Status: Final result Specimen: Blood from Arm, Right Updated: 09/23/23 0136     WBC 14.56 Thousand/uL      RBC 5.11 Million/uL      Hemoglobin 14.5 g/dL      Hematocrit 42.8 %      MCV 84 fL      MCH 28.4 pg      MCHC 33.9 g/dL      RDW 12.4 %      MPV 12.2 fL      Platelets 280 Thousands/uL      nRBC 0 /100 WBCs      Neutrophils Relative 73 %      Immat GRANS % 1 %      Lymphocytes Relative 12 %      Monocytes Relative 14 %      Eosinophils Relative 0 %      Basophils Relative 0 %      Neutrophils Absolute 10.67 Thousands/µL      Immature Grans Absolute 0.08 Thousand/uL      Lymphocytes Absolute 1.77 Thousands/µL      Monocytes Absolute 2.00 Thousand/µL      Eosinophils Absolute 0.01 Thousand/µL      Basophils Absolute 0.03 Thousands/µL                  No orders to display         Procedures  Procedures      ED Course  ED Course as of 09/23/23 0549   Sat Sep 23, 2023   0028 Still have a lot of pressure, all over. Back of head, sides, behind eyes. Blood pressure has been high. Restarted valsartan last week. Prescribed by family doctor. Last seen 6 months ago. 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 Wooshing side on left side. Looses vision for a minute to a minute and half   0031 Vomiting yesterday. SBIRT 20yo+    Flowsheet Row Most Recent Value   Initial Alcohol Screen: US AUDIT-C     1. How often do you have a drink containing alcohol? 0 Filed at: 09/23/2023 0010   2. How many drinks containing alcohol do you have on a typical day you are drinking? 0 Filed at: 09/23/2023 0010   3a. Male UNDER 65: How often do you have five or more drinks on one occasion? 0 Filed at: 09/23/2023 0010   3b. FEMALE Any Age, or MALE 65+: How often do you have 4 or more drinks on one occassion? 0 Filed at: 09/23/2023 0010   Audit-C Score 0 Filed at: 09/23/2023 0010   PAYTON: How many times in the past year have you. .. Used an illegal drug or used a prescription medication for non-medical reasons? Never Filed at: 09/23/2023 0010                Medical Decision Making  25year old male patient presenting for headaches progressively worsening over the past month. DDx including but not limited to: Migraine headache, atypical migraine headache, tension headache, cluster headache;   doubt ICH, SAH, tumor, meningitis, carbon monoxide poisoning, zoster, sinusitis. Patient was seen in the past week at [facility] for same complaint ion which labs and CT scan were completed. These labs and scans were reviewed at today's visit   Of note, patient has hypertension history and is not taking his medication and his blood pressure has been elevating over the past month. We sent CBC and CMP to assure no metabolic disturbances or anemia. No signs of infectious process occurring. Patient likely having a migraine. Patient treated with a migraine cocktail and reassessed. Patient stated he was having the best sleep of his life after the medication. Patient comfortable with discharge and neurology follow up. Patient was given numbers for neurology as well as cardiology to follow up with. Patient will follow up with cardiology as well to discuss blood pressure management.   Patient was given Reglan for outpatient prescription. Amount and/or Complexity of Data Reviewed  Labs: ordered. Risk  Prescription drug management. Disposition  Final diagnoses:   Migraine     Time reflects when diagnosis was documented in both MDM as applicable and the Disposition within this note     Time User Action Codes Description Comment    9/23/2023  2:50 AM Ang Starr Add [H26.027] Migraine       ED Disposition     ED Disposition   Discharge    Condition   Stable    Date/Time   Sat Sep 23, 2023  2:51 AM    Comment   Shira Ramirez discharge to home/self care. Follow-up Information     Follow up With Specialties Details Why Contact Info Additional [address] Neurology [facility] Neurology  Discuss migraines Kyselinadalgisa  [phone] [facility] Neurology [facility], [place], [place], [address]    [address] [facility] Cardiology  Discuss hypertenison [facility] [address] [facility] Cardiology [facility], [address] [facility], [place], [place], [address] [facility]          Discharge Medication List as of 9/23/2023  2:53 AM      START taking these medications    Details   metoclopramide (Reglan) 10 mg tablet Take 1 tablet (10 mg total) by mouth every 6 (six) hours for 10 days, Starting Sat 9/23/2023, Until Tue 10/3/2023, Normal         CONTINUE these medications which have NOT CHANGED    Details   predniSONE 10 mg tablet Six tablets day 1; 5 tablets day to; 4 tablets day 3; 3 tablets day 4; 2 tablets day 5; and 1 tablet day 6, Normal           No discharge procedures on file. PDMP Review       Value Time User    PDMP Reviewed  Yes 9/23/2023 12:27 AM Kade Ji MD           ED Provider  Attending physically available and evaluated Shira Ramirez. I managed the patient along with the ED Attending.     Electronically Signed by         Nagi Dunham MD  09/23/23 [id number]

## 2023-09-23 NOTE — ED ATTENDING ATTESTATION
9/22/2023  I, Sophie Andres MD, saw and evaluated the patient. I have discussed the patient with the resident/non-physician practitioner and agree with the resident's/non-physician practitioner's findings, Plan of Care, and MDM as documented in the resident's/non-physician practitioner's note, except where noted. All available labs and Radiology studies were reviewed. I was present for key portions of any procedure(s) performed by the resident/non-physician practitioner and I was immediately available to provide assistance. At this point I agree with the current assessment done in the Emergency Department. I have conducted an independent evaluation of this patient a history and physical is as follows: Patient is a 25year old male with a few weeks of worsening intermittent diffuse headache which has been constant recently. No trauma. No fever. (+) N/V recently. (+) photophobia. No SAH or migraine in family. Was last seen at [address] [place] [address] ED on 9/19/23 for migraine headache. Had unremarkable bloodwork and normal head CT at that time. States he did not drive here. [facility] website checked on this patient and last Rx filled was on 8/31/22 for phentermine for 30 day supply. NCAT. PERRL. (+) photosensitivity. Oropharynx normal. Moist mucous membranes. Neck supple. Lungs clear. Heart regular without murmur. Abdomen soft and nontender. Good bowel sounds. No edema. No rash noted. No jaundice. No focal deficits. DDx including but not limited to: tension headache, cluster headache, migraine, ICH, HTN; doubt SAH, tumor, meningitis, carbon monoxide poisoning, zoster, sinusitis. Will give migraine protocol IV and monitor BP.      ED Course         Critical Care Time  Procedures